# Patient Record
Sex: MALE | Race: OTHER | Employment: OTHER | URBAN - METROPOLITAN AREA
[De-identification: names, ages, dates, MRNs, and addresses within clinical notes are randomized per-mention and may not be internally consistent; named-entity substitution may affect disease eponyms.]

---

## 2017-03-27 ENCOUNTER — HOSPITAL ENCOUNTER (EMERGENCY)
Facility: HOSPITAL | Age: 67
Discharge: HOME | End: 2017-03-27
Attending: EMERGENCY MEDICINE
Payer: MEDICARE

## 2017-03-27 ENCOUNTER — APPOINTMENT (OUTPATIENT)
Dept: CT IMAGING | Facility: HOSPITAL | Age: 67
End: 2017-03-27
Attending: EMERGENCY MEDICINE
Payer: MEDICARE

## 2017-03-27 VITALS
TEMPERATURE: 98 F | WEIGHT: 180 LBS | BODY MASS INDEX: 27.28 KG/M2 | SYSTOLIC BLOOD PRESSURE: 139 MMHG | OXYGEN SATURATION: 96 % | RESPIRATION RATE: 16 BRPM | DIASTOLIC BLOOD PRESSURE: 95 MMHG | HEIGHT: 68 IN | HEART RATE: 73 BPM

## 2017-03-27 DIAGNOSIS — R42 VERTIGO: ICD-10-CM

## 2017-03-27 DIAGNOSIS — R51.9 NONINTRACTABLE EPISODIC HEADACHE, UNSPECIFIED HEADACHE TYPE: Primary | ICD-10-CM

## 2017-03-27 LAB
ALBUMIN SERPL BCP-MCNC: 4.6 G/DL (ref 3.5–4.8)
ALP SERPL-CCNC: 85 U/L (ref 32–100)
ALT SERPL-CCNC: 21 U/L (ref 17–63)
ANION GAP SERPL CALC-SCNC: 10 MMOL/L (ref 0–18)
AST SERPL-CCNC: 24 U/L (ref 15–41)
BACTERIA UR QL AUTO: NEGATIVE /HPF
BASOPHILS # BLD: 0.1 K/UL (ref 0–0.2)
BASOPHILS NFR BLD: 1 %
BILIRUB DIRECT SERPL-MCNC: 0.1 MG/DL (ref 0–0.2)
BILIRUB SERPL-MCNC: 0.8 MG/DL (ref 0.3–1.2)
BILIRUB UR QL: NEGATIVE
BUN SERPL-MCNC: 12 MG/DL (ref 8–20)
BUN/CREAT SERPL: 11.9 (ref 10–20)
CALCIUM SERPL-MCNC: 10.1 MG/DL (ref 8.5–10.5)
CHLORIDE SERPL-SCNC: 99 MMOL/L (ref 95–110)
CLARITY UR: CLEAR
CO2 SERPL-SCNC: 27 MMOL/L (ref 22–32)
COLOR UR: YELLOW
CREAT SERPL-MCNC: 1.01 MG/DL (ref 0.5–1.5)
EOSINOPHIL # BLD: 0.3 K/UL (ref 0–0.7)
EOSINOPHIL NFR BLD: 3 %
ERYTHROCYTE [DISTWIDTH] IN BLOOD BY AUTOMATED COUNT: 14.8 % (ref 11–15)
GLUCOSE SERPL-MCNC: 127 MG/DL (ref 70–99)
GLUCOSE UR-MCNC: NEGATIVE MG/DL
HCT VFR BLD AUTO: 46.5 % (ref 41–52)
HGB BLD-MCNC: 15.6 G/DL (ref 13.5–17.5)
HGB UR QL STRIP.AUTO: NEGATIVE
KETONES UR-MCNC: NEGATIVE MG/DL
LEUKOCYTE ESTERASE UR QL STRIP.AUTO: NEGATIVE
LIPASE SERPL-CCNC: 30 U/L (ref 22–51)
LYMPHOCYTES # BLD: 3.1 K/UL (ref 1–4)
LYMPHOCYTES NFR BLD: 27 %
MCH RBC QN AUTO: 30.4 PG (ref 27–32)
MCHC RBC AUTO-ENTMCNC: 33.5 G/DL (ref 32–37)
MCV RBC AUTO: 90.7 FL (ref 80–100)
MONOCYTES # BLD: 0.8 K/UL (ref 0–1)
MONOCYTES NFR BLD: 7 %
NEUTROPHILS # BLD AUTO: 7.3 K/UL (ref 1.8–7.7)
NEUTROPHILS NFR BLD: 64 %
NITRITE UR QL STRIP.AUTO: NEGATIVE
OSMOLALITY UR CALC.SUM OF ELEC: 283 MOSM/KG (ref 275–295)
PH UR: 7 [PH] (ref 5–8)
PLATELET # BLD AUTO: 241 K/UL (ref 140–400)
PMV BLD AUTO: 9.5 FL (ref 7.4–10.3)
POTASSIUM SERPL-SCNC: 4.4 MMOL/L (ref 3.3–5.1)
PROT SERPL-MCNC: 7.9 G/DL (ref 5.9–8.4)
PROT UR-MCNC: NEGATIVE MG/DL
RBC # BLD AUTO: 5.13 M/UL (ref 4.5–5.9)
RBC #/AREA URNS AUTO: 0 /HPF
SODIUM SERPL-SCNC: 136 MMOL/L (ref 136–144)
SP GR UR STRIP: 1.01 (ref 1–1.03)
UROBILINOGEN UR STRIP-ACNC: <2
VIT C UR-MCNC: 20 MG/DL
WBC # BLD AUTO: 11.5 K/UL (ref 4–11)
WBC #/AREA URNS AUTO: <1 /HPF

## 2017-03-27 PROCEDURE — 80048 BASIC METABOLIC PNL TOTAL CA: CPT

## 2017-03-27 PROCEDURE — 83690 ASSAY OF LIPASE: CPT

## 2017-03-27 PROCEDURE — 81003 URINALYSIS AUTO W/O SCOPE: CPT

## 2017-03-27 PROCEDURE — 85025 COMPLETE CBC W/AUTO DIFF WBC: CPT

## 2017-03-27 PROCEDURE — 99284 EMERGENCY DEPT VISIT MOD MDM: CPT

## 2017-03-27 PROCEDURE — 70450 CT HEAD/BRAIN W/O DYE: CPT

## 2017-03-27 PROCEDURE — 80076 HEPATIC FUNCTION PANEL: CPT

## 2017-03-27 PROCEDURE — 36415 COLL VENOUS BLD VENIPUNCTURE: CPT

## 2017-03-27 RX ORDER — ONDANSETRON 4 MG/1
4 TABLET, ORALLY DISINTEGRATING ORAL EVERY 4 HOURS PRN
Qty: 10 TABLET | Refills: 0 | Status: SHIPPED | OUTPATIENT
Start: 2017-03-27 | End: 2017-04-03

## 2017-03-27 RX ORDER — MECLIZINE HYDROCHLORIDE 25 MG/1
25 TABLET ORAL EVERY 6 HOURS PRN
Qty: 20 TABLET | Refills: 0 | Status: SHIPPED | OUTPATIENT
Start: 2017-03-27 | End: 2017-04-03

## 2017-03-28 NOTE — ED PROVIDER NOTES
Patient Seen in: [facility] AND [facility] Emergency Department    History   Patient presents with:  Abdomen/Flank Pain (GI/)    Stated Complaint: headache/nausea/vomiting    HPI    The patient is a 30-year-old male who presents with 9 days of intermittent hea 96%        Physical Exam   Constitutional: He is oriented to person, place, and time. He appears well-developed and well-nourished. HENT:   Head: Normocephalic and atraumatic.    Nose: Right sinus exhibits no maxillary sinus tenderness and no frontal sinu DIFFERENTIAL - Abnormal; Notable for the following:     WBC 11.5 (*)     All other components within normal limits   LIPASE - Normal   HEPATIC FUNCTION PANEL (7) - Normal   CBC WITH DIFFERENTIAL WITH PLATELET    Narrative:      The following orders were cre Tablet Dispersible  Take 1 tablet (4 mg total) by mouth every 4 (four) hours as needed for Nausea., Script printed, Disp-10 tablet, R-0

## 2022-03-07 ENCOUNTER — OFFICE VISIT (OUTPATIENT)
Dept: SURGERY | Facility: CLINIC | Age: 72
End: 2022-03-07
Payer: MEDICARE

## 2022-03-07 VITALS
DIASTOLIC BLOOD PRESSURE: 78 MMHG | HEIGHT: 68 IN | BODY MASS INDEX: 27.28 KG/M2 | HEART RATE: 55 BPM | WEIGHT: 180 LBS | SYSTOLIC BLOOD PRESSURE: 136 MMHG

## 2022-03-07 DIAGNOSIS — R97.20 ELEVATED PSA: Primary | ICD-10-CM

## 2022-03-07 PROCEDURE — 99204 OFFICE O/P NEW MOD 45 MIN: CPT | Performed by: UROLOGY

## 2022-04-12 ENCOUNTER — HOSPITAL ENCOUNTER (OUTPATIENT)
Dept: MRI IMAGING | Facility: HOSPITAL | Age: 72
Discharge: HOME | End: 2022-04-12
Attending: UROLOGY
Payer: MEDICARE

## 2022-04-12 DIAGNOSIS — R97.20 ELEVATED PSA: ICD-10-CM

## 2022-04-12 LAB — CREAT BLD-MCNC: 1 MG/DL

## 2022-04-12 PROCEDURE — 72197 MRI PELVIS W/O & W/DYE: CPT | Performed by: UROLOGY

## 2022-04-12 PROCEDURE — A9575 INJ GADOTERATE MEGLUMI 0.1ML: HCPCS | Performed by: UROLOGY

## 2022-04-12 PROCEDURE — 82565 ASSAY OF CREATININE: CPT

## 2022-04-18 ENCOUNTER — OFFICE VISIT (OUTPATIENT)
Dept: SURGERY | Facility: CLINIC | Age: 72
End: 2022-04-18
Payer: MEDICARE

## 2022-04-18 DIAGNOSIS — R97.20 ELEVATED PSA: Primary | ICD-10-CM

## 2022-04-18 PROCEDURE — 99213 OFFICE O/P EST LOW 20 MIN: CPT | Performed by: UROLOGY

## 2022-09-19 ENCOUNTER — OFFICE VISIT (OUTPATIENT)
Dept: SURGERY | Facility: CLINIC | Age: 72
End: 2022-09-19

## 2022-09-19 DIAGNOSIS — R97.20 ELEVATED PSA: Primary | ICD-10-CM

## 2022-09-19 RX ORDER — CEFDINIR 300 MG/1
300 CAPSULE ORAL EVERY 12 HOURS
Qty: 6 CAPSULE | Refills: 0 | Status: SHIPPED | OUTPATIENT
Start: 2022-09-19 | End: 2022-09-22

## 2022-09-19 RX ORDER — CIPROFLOXACIN 500 MG/1
500 TABLET, FILM COATED ORAL 2 TIMES DAILY
Qty: 6 TABLET | Refills: 0 | Status: SHIPPED | OUTPATIENT
Start: 2022-09-19

## 2022-09-23 ENCOUNTER — TELEPHONE (OUTPATIENT)
Dept: SURGERY | Facility: CLINIC | Age: 72
End: 2022-09-23

## 2022-09-23 NOTE — TELEPHONE ENCOUNTER
Called pt used , [place] ID# [id number] informed pt that we need to jose antonio his office prostate bx 10/21/22 to 10/28/22 due to change in Dr. Shayy Thayer schedule, pt is aware and agrees, informed pt to call the office if any questions for the prep, pt states he has all the instructions at home, pt aware to call sooner if he would like us to go over any prep with him sooner to the visti. Pt aware and thankful.

## 2022-10-28 RX ORDER — CEFDINIR 300 MG/1
300 CAPSULE ORAL EVERY 12 HOURS
Qty: 3 CAPSULE | Refills: 0 | Status: SHIPPED | OUTPATIENT
Start: 2022-10-28

## 2022-10-28 RX ORDER — CIPROFLOXACIN 500 MG/1
500 TABLET, FILM COATED ORAL 2 TIMES DAILY
Qty: 3 TABLET | Refills: 0 | Status: SHIPPED | OUTPATIENT
Start: 2022-10-28

## 2022-10-28 NOTE — TELEPHONE ENCOUNTER
Signed 3 tablet supply for cipro and cefdinir to pt pharmacy for pt to take in addition to the 3 tablets at home.

## 2022-10-31 ENCOUNTER — PROCEDURE (OUTPATIENT)
Dept: SURGERY | Facility: CLINIC | Age: 72
End: 2022-10-31
Payer: MEDICARE

## 2022-10-31 VITALS — DIASTOLIC BLOOD PRESSURE: 96 MMHG | HEART RATE: 64 BPM | SYSTOLIC BLOOD PRESSURE: 161 MMHG

## 2022-10-31 DIAGNOSIS — R97.20 ELEVATED PSA: Primary | ICD-10-CM

## 2022-10-31 PROCEDURE — 76942 ECHO GUIDE FOR BIOPSY: CPT | Performed by: UROLOGY

## 2022-10-31 PROCEDURE — 55700 BIOPSY OF PROSTATE,NEEDLE/PUNCH: CPT | Performed by: UROLOGY

## 2022-10-31 PROCEDURE — 99213 OFFICE O/P EST LOW 20 MIN: CPT | Performed by: UROLOGY

## 2022-11-04 ENCOUNTER — TELEPHONE (OUTPATIENT)
Dept: SURGERY | Facility: CLINIC | Age: 72
End: 2022-11-04

## 2022-11-07 ENCOUNTER — TELEPHONE (OUTPATIENT)
Dept: SURGERY | Facility: CLINIC | Age: 72
End: 2022-11-07

## 2022-11-07 NOTE — TELEPHONE ENCOUNTER
Pt had prostate biopsy with DEBBIE on 10/31/22 and pt states he has been having difficulty urinating since then. Pt states this morning he woke up and was straining a lot to urinate and was only able to urinate very small amount. Pt states he has been able to urinate more fluid volume as the day has gone by but is still having to push and strain. Denies visible blood in urine today, denies burning with urination, and denies pain, denies cloudiness in urine. Denies fevers or flu-like sx. Advised pt to come in tomorrow morning for bladder scan to check for urinary retention. Pt agreeable. Pt asking if he should be taking medication to help him urinate. Routed to [facility] due to [facility] [place] out of office;    Please advise, thank you.

## 2022-11-07 NOTE — TELEPHONE ENCOUNTER
Patients spouse states patient has biopsy on 10/31 and is having urinary problems. Patient is Danish speaking.   Please advise

## 2022-11-08 ENCOUNTER — NURSE ONLY (OUTPATIENT)
Dept: SURGERY | Facility: CLINIC | Age: 72
End: 2022-11-08
Payer: MEDICARE

## 2022-11-08 DIAGNOSIS — R30.0 DYSURIA: Primary | ICD-10-CM

## 2022-11-08 LAB
APPEARANCE: CLEAR
BILIRUB UR QL: NEGATIVE
BILIRUBIN: NEGATIVE
CLARITY UR: CLEAR
COLOR UR: YELLOW
GLUCOSE (URINE DIPSTICK): NEGATIVE MG/DL
GLUCOSE UR-MCNC: NEGATIVE MG/DL
KETONES (URINE DIPSTICK): NEGATIVE MG/DL
KETONES UR-MCNC: NEGATIVE MG/DL
MULTISTIX LOT#: ABNORMAL NUMERIC
NITRITE UR QL STRIP.AUTO: POSITIVE
NITRITE, URINE: POSITIVE
PH UR: 6 [PH] (ref 5–8)
PH, URINE: 6 (ref 4.5–8)
PROT UR-MCNC: NEGATIVE MG/DL
PROTEIN (URINE DIPSTICK): NEGATIVE MG/DL
SP GR UR STRIP: 1.01 (ref 1–1.03)
SPECIFIC GRAVITY: 1.01 (ref 1–1.03)
URINE-COLOR: YELLOW
UROBILINOGEN UR STRIP-ACNC: <2
UROBILINOGEN,SEMI-QN: 0.2 MG/DL (ref 0–1.9)
VIT C UR-MCNC: NEGATIVE MG/DL
WBC CLUMPS UR QL AUTO: PRESENT /HPF

## 2022-11-08 PROCEDURE — 81002 URINALYSIS NONAUTO W/O SCOPE: CPT | Performed by: NURSE PRACTITIONER

## 2022-11-08 PROCEDURE — 51798 US URINE CAPACITY MEASURE: CPT | Performed by: UROLOGY

## 2022-11-08 RX ORDER — SULFAMETHOXAZOLE AND TRIMETHOPRIM 800; 160 MG/1; MG/1
1 TABLET ORAL 2 TIMES DAILY
Qty: 20 TABLET | Refills: 0 | Status: SHIPPED | OUTPATIENT
Start: 2022-11-08 | End: 2022-11-10

## 2022-11-08 NOTE — PROGRESS NOTES
Pt here today for nurse visit due to dysuria since Thursday. Pt states he has been having to strain and push urine out with minimal output. This morning pt states urination has improved and is not having to strain to urinate. Pt was asked to urinate in office. UA manual performed and showed nitrates. Pt was bladder scanned and PVR 0 ml. Results given to [facility] who recommended UA and culture and ABX. Instructions given to pt and pt verbalized understanding. Pt also asking about biopsy results. Notified pt that [facility] will call them later today. Pt asked to call home phone number.

## 2022-11-17 ENCOUNTER — OFFICE VISIT (OUTPATIENT)
Dept: SURGERY | Facility: CLINIC | Age: 72
End: 2022-11-17
Payer: MEDICARE

## 2022-11-17 DIAGNOSIS — C61 PROSTATE CANCER (HCC): Primary | ICD-10-CM

## 2022-11-17 PROCEDURE — 99215 OFFICE O/P EST HI 40 MIN: CPT | Performed by: UROLOGY

## 2023-02-20 ENCOUNTER — OFFICE VISIT (OUTPATIENT)
Dept: SURGERY | Facility: CLINIC | Age: 73
End: 2023-02-20

## 2023-02-20 ENCOUNTER — LAB ENCOUNTER (OUTPATIENT)
Dept: LAB | Facility: HOSPITAL | Age: 73
End: 2023-02-20
Attending: UROLOGY
Payer: MEDICARE

## 2023-02-20 DIAGNOSIS — C61 PROSTATE CANCER (HCC): Primary | ICD-10-CM

## 2023-02-20 LAB — PSA SERPL-MCNC: 9.76 NG/ML (ref ?–4)

## 2023-02-20 PROCEDURE — 84153 ASSAY OF PSA TOTAL: CPT | Performed by: UROLOGY

## 2023-02-20 PROCEDURE — 36415 COLL VENOUS BLD VENIPUNCTURE: CPT | Performed by: UROLOGY

## 2023-02-20 PROCEDURE — 99213 OFFICE O/P EST LOW 20 MIN: CPT | Performed by: UROLOGY

## 2023-02-27 ENCOUNTER — TELEPHONE (OUTPATIENT)
Dept: SURGERY | Facility: CLINIC | Age: 73
End: 2023-02-27

## 2023-02-27 NOTE — TELEPHONE ENCOUNTER
- Call center transferred pt and wife Terese Toribio), returning call from this RN.    - Utilizing  Bland Lenz #[id number], I read K[facility] message to the pt and wife. Both acknowledged understanding of the information and next steps. - pt already has f/u appt on Monday, 5/22, so instructed pt to go to our lab towards the end of the week, the week prior to appt, in order to repeat the PSA. Both acknowledged understanding and agreed to plan.   - encounter complete

## 2023-02-27 NOTE — TELEPHONE ENCOUNTER
Utilizing  [place] DV#[id number], Candie on VM to call me back at  to discuss results and next steps.    ----- Message from Amy Bajwa MD sent at 2/27/2023  8:04 AM CST -----  Please contact this patient. Let him know that his PSA obtained after his last office visit has increased to 9.76. I would like the patient to see me in mid May with a repeat PSA 1 to 2 days prior to the visit. If the PSA continues to increase we may have to reconsider active surveillance as a method of managing his prostate cancer. I have placed an order for a repeat PSA to be done prior to that visit in mid May.

## 2023-05-17 ENCOUNTER — LAB ENCOUNTER (OUTPATIENT)
Dept: LAB | Facility: HOSPITAL | Age: 73
End: 2023-05-17
Attending: UROLOGY
Payer: MEDICARE

## 2023-05-17 LAB — PSA SERPL-MCNC: 9.56 NG/ML (ref ?–4)

## 2023-05-17 PROCEDURE — 36415 COLL VENOUS BLD VENIPUNCTURE: CPT | Performed by: UROLOGY

## 2023-05-17 PROCEDURE — 84153 ASSAY OF PSA TOTAL: CPT | Performed by: UROLOGY

## 2023-05-22 ENCOUNTER — OFFICE VISIT (OUTPATIENT)
Dept: SURGERY | Facility: CLINIC | Age: 73
End: 2023-05-22

## 2023-05-22 DIAGNOSIS — C61 PROSTATE CANCER (HCC): Primary | ICD-10-CM

## 2023-05-22 PROCEDURE — 99214 OFFICE O/P EST MOD 30 MIN: CPT | Performed by: UROLOGY

## 2023-05-23 ENCOUNTER — TELEPHONE (OUTPATIENT)
Dept: HEMATOLOGY/ONCOLOGY | Facility: HOSPITAL | Age: 73
End: 2023-05-23

## 2023-05-23 NOTE — TELEPHONE ENCOUNTER
Please call Dominick Monahan to schedule a consult with Dr Bobby Bryant for the patient. Referred by Dr Kamini Denson. DX-Prostate cancer.

## 2023-06-07 ENCOUNTER — OFFICE VISIT (OUTPATIENT)
Dept: RADIATION ONCOLOGY | Facility: HOSPITAL | Age: 73
End: 2023-06-07
Attending: RADIOLOGY
Payer: MEDICARE

## 2023-06-07 VITALS
DIASTOLIC BLOOD PRESSURE: 73 MMHG | SYSTOLIC BLOOD PRESSURE: 155 MMHG | WEIGHT: 178.38 LBS | OXYGEN SATURATION: 100 % | RESPIRATION RATE: 18 BRPM | HEART RATE: 58 BPM | TEMPERATURE: 97 F | BODY MASS INDEX: 27 KG/M2

## 2023-06-07 DIAGNOSIS — C61 PROSTATE CANCER (HCC): Primary | ICD-10-CM

## 2023-06-07 PROCEDURE — 99212 OFFICE O/P EST SF 10 MIN: CPT

## 2023-06-07 NOTE — CONSULTS
[facility]    PATIENT'S NAME: Rob Blakely   RADIATION ONCOLOGIST: Gareth Abdalla. Travis Orantes MD   PATIENT ACCOUNT #: [de-identified] LOCATION: [facility] RECORD #: [id number] YOB: 1950   CONSULTATION DATE: 06/07/2023       RADIATION ONCOLOGY CONSULTATION    REFERRING PHYSICIAN:  Yasmany Martini MD    DIAGNOSIS:  Adenocarcinoma of the prostate, PSA 9.76, grade group 2, favorable intermediate risk. HISTORY OF PRESENT ILLNESS:  The patient is a 59-year-old male diagnosed with prostate cancer back in November 2022. He had a PSA which had been rising but no suspicious findings on physical exam.  The PSA had been 4.43 in January 2022 but jumped up to 7.85 by July of that year. It continued to rise and went as high as 9.76. Imaging of the prostate included an MRI back on April 12, 2022, which showed a posteromedial left paramedian area of amorphous hypoattenuation with associated diffusion restriction. It was deemed PI-RADS category 3 with no evidence of any lymphadenopathy or osseous disease. Eventually, the patient did have a biopsy on 11/01/2022. This demonstrated adenocarcinoma of the prostate of grade group 2 in 1 of 3 cores from the right apex. All other prostatic cores were negative. Even in the involved core, only 10% of submitted tissue had disease and there was no perineural invasion. However, the patient did have a difficult time after the biopsy and this was complicated by an E. coli infection with prostatitis and a UTI. This was quite problematic for him and difficult for the infection to resolve. After discussing his potential treatment options, the patient initially opted for active surveillance, but upon realizing that this would require repeat biopsies in addition to blood draws, the patient wished to reconsider his decision regarding treatment.   He was uninterested in surgery and was, therefore, referred to Radiation Oncology for discussion regarding radiation treatment. The patient otherwise is feeling well and denies any particular issues or complaints. He has an AUA score today of 3/35 and an ED score of 9/25. He denies any fevers, chills, weight loss, changes in appetite, bony or joint pain, or other constitutional symptoms. PAST MEDICAL HISTORY:  The patient has a past history of hyperlipidemia, arthritis, hypertension, as well as diabetes, and prostate cancer as per HPI. PAST SURGICAL HISTORY:  None. MEDICATIONS:  Atorvastatin, enalapril, ibuprofen, meclizine, metformin, and pioglitazone. ALLERGIES:  No known drug allergies. FAMILY HISTORY:  Negative for malignancy. SOCIAL HISTORY:  The patient is  and seen in consultation with his wife. He is retired but does do some part-time work. He denies any smoking or alcohol history and has no transportation-related difficulties. REVIEW OF SYSTEMS:  A 14-point review of systems is performed. Pertinent positives and negatives are as per HPI. PHYSICAL EXAMINATION:    GENERAL:  A 70-year-old male who is pleasant, cooperative, and alert, awake, and oriented x3. He is in no acute distress. He has an ECOG performance score of 1 and a current pain score of 0. VITAL SIGNS:  Blood pressure 155/73, pulse of 58, respiratory rate of 18, and temperature 97.0. His weight is 178 pounds. HEENT:  Pupils are equal, round, react to light with accommodation and the extraocular movements are intact. The oral cavity is without ulceration or lesions. NECK:  Supple with no lymphadenopathy. LUNGS:  Clear to auscultation bilaterally. HEART:  Regular rate and rhythm. Normal S1, S2, and no audible murmurs. LYMPHATICS:  There is no supraclavicular, axillary, inguinal lymphadenopathy. ABDOMEN:  Soft, nontender, and nondistended with normoactive bowel sounds and no hepatosplenomegaly. EXTREMITIES:  Extremities without clubbing, cyanosis, edema.   NEUROLOGIC:  Cranial nerves II-XII are grossly intact. There are no focal deficits. IMPRESSION:  This is a 70-year-old male with a diagnosis of a favorable intermediate-risk prostate cancer. He has a PSA which has reached as high as 9.76 and grade group 2 disease. He only has a single core of involvement with a low percentage of cancer within the specimen and no perineural invasion. A MRI back in April 2022 showed no evidence of any regional or distant disease. The patient is slated for repeat MRI in the next week or so. He is uninterested in surgery as definitive therapy. RECOMMENDATIONS:  I do believe the patient is a good candidate for definitive treatment with radiation. Certainly, active surveillance is a reasonable option. As part of this treatment, however, the patient does require routine blood draws and repeat biopsies at defined intervals. The patient is uninterested in having additional biopsies based upon his prior infection and would, therefore, prefer to move forward with definitive therapy. He is uninterested in surgery and presents to discuss radiation. To that extent, I recommend 7000 cGy in 250 cGy fractions. I would treat the prostate alone with no effort to duglas after draining lymphatics, as the risk appears to be quite low. Of course, if the upcoming MRI shows something different, I would alter this plan. Otherwise, I would utilize both intensity-modulated radiotherapy as well as image guidance to get the most accurate and precise treatment possible while trying to spare morbidity to the bowel and rectum and other surrounding tissues. With this treatment, I am quite optimistic that we will have a good outcome and the patient's overall likelihood of cure is high. I do not recommend utilization of any androgen deprivation, as this would not be required given favorable intermediate-risk disease. I then had a long talk with the patient and his wife regarding the potential side effects of therapy.   I told him that he should tolerate treatment well, but there will be a number of issues. This specifically includes urinary and bowel problems, and they will worsen during the course of therapy. Namely, he will experience urinary frequency, urgency, incomplete emptying, nocturia, and hesitancy. He also will have some loose stools or diarrhea. These side effects will all progress during therapy but should resolve fairly quickly after radiation is complete. He will have fatigue as well, and this will follow a similar pattern. After treatment is over, I would expect his side effects to resolve within a few weeks. Long-term or permanent side effects including radiation cystitis or radiation proctitis are theoretically possible though fairly unlikely given modern radiotherapeutic techniques and image-guided treatment. Still, these possibilities do exist, and the patient was made aware. Additionally, I would expect him to retain normal sexual functioning, but it is possible that he may have decreased capabilities and even impotence as a result of therapy. Following this long and thorough discussion of all the risks and benefits of treatment, the patient and his wife indicate that they understand all these risks and do wish to proceed with treatment as I previously dictated. We, therefore, will schedule the patient for simulation soon. He will also be getting his MRI which I will fuse to his treatment planning CT for accurate targeting. We then will proceed with treatment as previously dictated. Thank you very much for allowing me the opportunity to participate in the care of this patient. If there should be any questions regarding the radiotherapy, please feel free to contact me at any time. Dictated By Jesi Richards MD  d: 06/07/2023 10:46:23  t: 06/07/2023 11:02:57  [facility] [id number]  [facility]/    cc: MD Teri Pollack.  Cassius Walter MD

## 2023-06-07 NOTE — PATIENT INSTRUCTIONS
We will call you to schedule ct simulation for planning of radiation. For any questions in regard to radiation call us at [phone].

## 2023-06-30 ENCOUNTER — APPOINTMENT (OUTPATIENT)
Dept: RADIATION ONCOLOGY | Facility: HOSPITAL | Age: 73
End: 2023-06-30
Attending: RADIOLOGY
Payer: MEDICARE

## 2023-06-30 ENCOUNTER — HOSPITAL ENCOUNTER (OUTPATIENT)
Dept: MRI IMAGING | Facility: HOSPITAL | Age: 73
Discharge: HOME | End: 2023-06-30
Attending: UROLOGY
Payer: MEDICARE

## 2023-06-30 DIAGNOSIS — C61 PROSTATE CANCER (HCC): ICD-10-CM

## 2023-06-30 PROCEDURE — A9575 INJ GADOTERATE MEGLUMI 0.1ML: HCPCS | Performed by: UROLOGY

## 2023-06-30 PROCEDURE — 72197 MRI PELVIS W/O & W/DYE: CPT | Performed by: UROLOGY

## 2023-06-30 RX ORDER — GADOTERATE MEGLUMINE 376.9 MG/ML
20 INJECTION INTRAVENOUS
Status: COMPLETED | OUTPATIENT
Start: 2023-06-30 | End: 2023-06-30

## 2023-06-30 RX ADMIN — GADOTERATE MEGLUMINE 16 ML: 376.9 INJECTION INTRAVENOUS at 09:04:00

## 2023-07-01 ENCOUNTER — APPOINTMENT (OUTPATIENT)
Dept: RADIATION ONCOLOGY | Facility: HOSPITAL | Age: 73
End: 2023-07-01
Attending: RADIOLOGY
Payer: MEDICARE

## 2023-07-01 ENCOUNTER — APPOINTMENT (OUTPATIENT)
Dept: RADIATION ONCOLOGY | Facility: HOSPITAL | Age: 73
End: 2023-07-01
Attending: RADIOLOGY

## 2023-07-01 ENCOUNTER — NURSE ONLY (OUTPATIENT)
Dept: RADIATION ONCOLOGY | Facility: HOSPITAL | Age: 73
End: 2023-07-01
Attending: RADIOLOGY

## 2023-07-05 PROCEDURE — 77399 UNLISTED PX MED RADJ PHYSICS: CPT | Performed by: RADIOLOGY

## 2023-07-10 ENCOUNTER — TELEPHONE (OUTPATIENT)
Dept: SURGERY | Facility: CLINIC | Age: 73
End: 2023-07-10

## 2023-07-10 PROCEDURE — 77301 RADIOTHERAPY DOSE PLAN IMRT: CPT | Performed by: RADIOLOGY

## 2023-07-10 PROCEDURE — 77338 DESIGN MLC DEVICE FOR IMRT: CPT | Performed by: RADIOLOGY

## 2023-07-10 PROCEDURE — 77300 RADIATION THERAPY DOSE PLAN: CPT | Performed by: RADIOLOGY

## 2023-07-10 NOTE — TELEPHONE ENCOUNTER
----- Message from Silva Steven MD sent at 7/10/2023  7:43 AM CDT -----  Urology staff,  Please contact this patient. Let him know that I reviewed his recent prostate MRI. It shows slight progression compared to his previous MRI in terms of signal abnormalities that indicate the likely presence of clinically significant disease. Of note, the location of the disease on MRI is different from the location where cancer was detected on his biopsy. I would encourage him to proceed with radiation treatments as discussed during his recent visit with Dr. Jeremías Kaur.

## 2023-07-10 NOTE — TELEPHONE ENCOUNTER
I called pt with the assistance of Indian interpretor Leatha Carrasco # [id number] and Informed him of [facility]'s results msg as stated below and  pt verbalized understanding and compliance.

## 2023-07-17 ENCOUNTER — APPOINTMENT (OUTPATIENT)
Dept: RADIATION ONCOLOGY | Facility: HOSPITAL | Age: 73
End: 2023-07-17
Attending: RADIOLOGY
Payer: MEDICARE

## 2023-07-17 PROCEDURE — 77385 HC IMRT SIMPLE: CPT | Performed by: RADIOLOGY

## 2023-07-18 ENCOUNTER — OFFICE VISIT (OUTPATIENT)
Dept: RADIATION ONCOLOGY | Facility: HOSPITAL | Age: 73
End: 2023-07-18
Attending: RADIOLOGY
Payer: MEDICARE

## 2023-07-18 VITALS
HEART RATE: 67 BPM | OXYGEN SATURATION: 100 % | SYSTOLIC BLOOD PRESSURE: 152 MMHG | TEMPERATURE: 97 F | BODY MASS INDEX: 26 KG/M2 | DIASTOLIC BLOOD PRESSURE: 78 MMHG | WEIGHT: 174 LBS | RESPIRATION RATE: 18 BRPM

## 2023-07-18 DIAGNOSIS — C61 PROSTATE CANCER (HCC): Primary | ICD-10-CM

## 2023-07-18 PROCEDURE — 77385 HC IMRT SIMPLE: CPT | Performed by: RADIOLOGY

## 2023-07-19 PROCEDURE — 77385 HC IMRT SIMPLE: CPT | Performed by: RADIOLOGY

## 2023-07-20 PROCEDURE — 77385 HC IMRT SIMPLE: CPT | Performed by: RADIOLOGY

## 2023-07-21 PROCEDURE — 77385 HC IMRT SIMPLE: CPT | Performed by: RADIOLOGY

## 2023-07-21 PROCEDURE — 77336 RADIATION PHYSICS CONSULT: CPT | Performed by: RADIOLOGY

## 2023-07-24 PROCEDURE — 77385 HC IMRT SIMPLE: CPT | Performed by: RADIOLOGY

## 2023-07-25 ENCOUNTER — OFFICE VISIT (OUTPATIENT)
Dept: RADIATION ONCOLOGY | Facility: HOSPITAL | Age: 73
End: 2023-07-25
Attending: RADIOLOGY
Payer: MEDICARE

## 2023-07-25 VITALS
SYSTOLIC BLOOD PRESSURE: 150 MMHG | RESPIRATION RATE: 18 BRPM | DIASTOLIC BLOOD PRESSURE: 75 MMHG | BODY MASS INDEX: 27 KG/M2 | HEART RATE: 60 BPM | TEMPERATURE: 97 F | WEIGHT: 179 LBS | OXYGEN SATURATION: 100 %

## 2023-07-25 DIAGNOSIS — C61 PROSTATE CANCER (HCC): Primary | ICD-10-CM

## 2023-07-25 PROCEDURE — 77385 HC IMRT SIMPLE: CPT | Performed by: RADIOLOGY

## 2023-07-26 PROCEDURE — 77385 HC IMRT SIMPLE: CPT | Performed by: RADIOLOGY

## 2023-07-27 PROCEDURE — 77385 HC IMRT SIMPLE: CPT | Performed by: RADIOLOGY

## 2023-07-28 PROCEDURE — 77385 HC IMRT SIMPLE: CPT | Performed by: RADIOLOGY

## 2023-07-28 PROCEDURE — 77336 RADIATION PHYSICS CONSULT: CPT | Performed by: RADIOLOGY

## 2023-07-31 PROCEDURE — 77385 HC IMRT SIMPLE: CPT | Performed by: RADIOLOGY

## 2023-08-01 ENCOUNTER — APPOINTMENT (OUTPATIENT)
Dept: RADIATION ONCOLOGY | Facility: HOSPITAL | Age: 73
End: 2023-08-01
Attending: RADIOLOGY
Payer: MEDICARE

## 2023-08-01 ENCOUNTER — HOSPITAL ENCOUNTER (OUTPATIENT)
Dept: RADIATION ONCOLOGY | Facility: HOSPITAL | Age: 73
Discharge: HOME | End: 2023-08-01
Attending: RADIOLOGY
Payer: MEDICARE

## 2023-08-01 ENCOUNTER — TELEPHONE (OUTPATIENT)
Dept: RADIATION ONCOLOGY | Facility: HOSPITAL | Age: 73
End: 2023-08-01

## 2023-08-01 VITALS
BODY MASS INDEX: 27 KG/M2 | HEART RATE: 70 BPM | TEMPERATURE: 97 F | RESPIRATION RATE: 18 BRPM | OXYGEN SATURATION: 98 % | WEIGHT: 177 LBS | SYSTOLIC BLOOD PRESSURE: 145 MMHG | DIASTOLIC BLOOD PRESSURE: 82 MMHG

## 2023-08-01 DIAGNOSIS — C61 PROSTATE CANCER (HCC): Primary | ICD-10-CM

## 2023-08-01 DIAGNOSIS — C61 PROSTATE CANCER (HCC): ICD-10-CM

## 2023-08-01 PROCEDURE — 77385 HC IMRT SIMPLE: CPT | Performed by: RADIOLOGY

## 2023-08-01 RX ORDER — TAMSULOSIN HYDROCHLORIDE 0.4 MG/1
0.4 CAPSULE ORAL DAILY
Qty: 30 CAPSULE | Refills: 1 | Status: SHIPPED | OUTPATIENT
Start: 2023-08-01 | End: 2023-08-01

## 2023-08-01 RX ORDER — TAMSULOSIN HYDROCHLORIDE 0.4 MG/1
0.4 CAPSULE ORAL DAILY
Qty: 30 CAPSULE | Refills: 5 | Status: SHIPPED | OUTPATIENT
Start: 2023-08-01 | End: 2024-01-28

## 2023-08-01 NOTE — TELEPHONE ENCOUNTER
[facility] 90 Day Prescription Request: tamsulosin 0.4 MG Oral Cap Take 1 capsule (0.4 mg total) by mouth daily. , Normal, Disp-30 capsule, [facility] [address] [facility], [phone], [phone] Thanks Marv villagomez

## 2023-08-02 PROCEDURE — 77385 HC IMRT SIMPLE: CPT | Performed by: RADIOLOGY

## 2023-08-03 ENCOUNTER — TELEPHONE (OUTPATIENT)
Dept: RADIATION ONCOLOGY | Facility: HOSPITAL | Age: 73
End: 2023-08-03

## 2023-08-08 ENCOUNTER — OFFICE VISIT (OUTPATIENT)
Dept: RADIATION ONCOLOGY | Facility: HOSPITAL | Age: 73
End: 2023-08-08
Attending: RADIOLOGY
Payer: MEDICARE

## 2023-08-08 VITALS
DIASTOLIC BLOOD PRESSURE: 78 MMHG | BODY MASS INDEX: 27 KG/M2 | HEART RATE: 67 BPM | RESPIRATION RATE: 18 BRPM | OXYGEN SATURATION: 100 % | WEIGHT: 179.63 LBS | TEMPERATURE: 97 F | SYSTOLIC BLOOD PRESSURE: 150 MMHG

## 2023-08-08 DIAGNOSIS — C61 PROSTATE CANCER (HCC): Primary | ICD-10-CM

## 2023-08-10 ENCOUNTER — DIETICIAN VISIT (OUTPATIENT)
Dept: NUTRITION | Facility: HOSPITAL | Age: 73
End: 2023-08-10

## 2023-08-10 VITALS — BODY MASS INDEX: 27 KG/M2 | HEIGHT: 68 IN

## 2023-08-15 ENCOUNTER — OFFICE VISIT (OUTPATIENT)
Dept: RADIATION ONCOLOGY | Facility: HOSPITAL | Age: 73
End: 2023-08-15
Attending: RADIOLOGY
Payer: MEDICARE

## 2023-08-15 VITALS
TEMPERATURE: 97 F | OXYGEN SATURATION: 100 % | DIASTOLIC BLOOD PRESSURE: 71 MMHG | SYSTOLIC BLOOD PRESSURE: 140 MMHG | WEIGHT: 181 LBS | HEART RATE: 72 BPM | RESPIRATION RATE: 18 BRPM | BODY MASS INDEX: 28 KG/M2

## 2023-08-15 DIAGNOSIS — C61 PROSTATE CANCER (HCC): Primary | ICD-10-CM

## 2023-08-22 ENCOUNTER — OFFICE VISIT (OUTPATIENT)
Dept: RADIATION ONCOLOGY | Facility: HOSPITAL | Age: 73
End: 2023-08-22
Attending: RADIOLOGY
Payer: MEDICARE

## 2023-08-22 VITALS
HEART RATE: 72 BPM | TEMPERATURE: 98 F | RESPIRATION RATE: 18 BRPM | DIASTOLIC BLOOD PRESSURE: 63 MMHG | WEIGHT: 180.38 LBS | OXYGEN SATURATION: 98 % | SYSTOLIC BLOOD PRESSURE: 118 MMHG | BODY MASS INDEX: 27 KG/M2

## 2023-08-22 DIAGNOSIS — C61 PROSTATE CANCER (HCC): Primary | ICD-10-CM

## 2023-08-22 NOTE — PATIENT INSTRUCTIONS
Follow up with Dr. Michele Yarbrough in 3 months. Yoandy Mendoza will call you to schedule your follow up appointment. Please call [phone] with any radiation questions. 1 week prior to PSA blood test, please refrain from bike riding, sexual activity and no prostate exams. Schedule your PSA prior to your follow up. Call [phone] to schedule.

## 2023-09-28 ENCOUNTER — TELEPHONE (OUTPATIENT)
Dept: RADIATION ONCOLOGY | Facility: HOSPITAL | Age: 73
End: 2023-09-28

## 2023-11-15 ENCOUNTER — LAB ENCOUNTER (OUTPATIENT)
Dept: LAB | Facility: HOSPITAL | Age: 73
End: 2023-11-15
Attending: RADIOLOGY
Payer: MEDICARE

## 2023-11-15 DIAGNOSIS — C61 PROSTATE CANCER (HCC): ICD-10-CM

## 2023-11-15 LAB
PSA SERPL-MCNC: 2.45 NG/ML (ref ?–4)
PSA SERPL-MCNC: 3.42 NG/ML (ref ?–4)

## 2023-11-15 PROCEDURE — 84153 ASSAY OF PSA TOTAL: CPT

## 2023-11-15 PROCEDURE — 36415 COLL VENOUS BLD VENIPUNCTURE: CPT

## 2023-11-17 ENCOUNTER — OFFICE VISIT (OUTPATIENT)
Dept: RADIATION ONCOLOGY | Facility: HOSPITAL | Age: 73
End: 2023-11-17
Attending: RADIOLOGY
Payer: MEDICARE

## 2023-11-17 VITALS
WEIGHT: 177.81 LBS | HEART RATE: 57 BPM | DIASTOLIC BLOOD PRESSURE: 95 MMHG | BODY MASS INDEX: 27 KG/M2 | TEMPERATURE: 97 F | OXYGEN SATURATION: 98 % | RESPIRATION RATE: 18 BRPM | SYSTOLIC BLOOD PRESSURE: 158 MMHG

## 2023-11-17 DIAGNOSIS — C61 PROSTATE CANCER (HCC): Primary | ICD-10-CM

## 2023-11-17 PROCEDURE — 99211 OFF/OP EST MAY X REQ PHY/QHP: CPT

## 2023-11-17 NOTE — PATIENT INSTRUCTIONS
Follow up with Dr. Darrold Brittle in 6 months. Maggi Khan will call you to schedule your follow up appointment. Please call [phone] with any radiation questions. 1 week prior to PSA blood test, please refrain from bike riding, sexual activity and no prostate exams. Schedule your PSA prior to your follow up. Call [phone] to schedule.

## 2023-11-18 NOTE — PROGRESS NOTES
[facility]    PATIENT'S NAME: Romayne Knapp   RADIATION ONCOLOGIST: Patrick Sandoval. Iraida Ch MD   PATIENT ACCOUNT #: [de-identified] LOCATION: [facility] RECORD #: [id number] YOB: 1950   FOLLOW-UP DATE: 11/17/2023       RADIATION ONCOLOGY FOLLOW-UP NOTE    REFERRING PHYSICIAN:  Timmy Martínez MD     DIAGNOSIS:  Adenocarcinoma of the prostate, PSA 9.76, grade group 2, favorable intermediate risk. REGION TREATED:  Prostate, 7000 cGy, completed 08/24/2023. INTERVAL SINCE COMPLETION OF RADIATION THERAPY:  3 months. PATIENT STATUS:  Clinically and biochemically LARRY. HISTORY:  The patient is a 66-year-old male who presents today for a routine followup visit. He has a history of a favorable intermediate-risk prostate cancer with a PSA up to 9.76. A biopsy had showed grade group 2 disease in 1 of 3 cores from the right apex, and all other cores were negative. Even within the involved core, only 10% of the submitted tissue had disease with no perineural invasion. He did have a difficult time after the biopsy, as it was complicated by an E coli infection with prostatitis and a UTI. Eventually this resolved. He had originally wanted to opt for active surveillance, but when he heard that additional biopsies would be required, he wished to proceed with treatment. I then treated the prostate to 7000 cGy in 28 fractions, completing on 08/24/2023. The patient presents today for his first followup visit since completing his radiation. Overall, he is doing extremely well. He denies any particular issues or complaints and feels that he has made a full recovery from the radiation. He has no bowel-related problems, and his appetite is normal.  He has an AUA score today of 5/35. This is very slightly increased from his pretreatment level of 3/35. He continues to have a low ED score. He denies other issues or complaints. His most recent PSA was on 11/15/2023.   This is read as a PSA of 3.42 and a total PSA of 2.45. They are running 2 separate assays, and there is some confusion as to which to use. PHYSICAL EXAMINATION:    VITAL SIGNS:  On exam today, the patient has a blood pressure 158/95, pulse of 57, respiratory rate of 18, and a temperature of 97.3. His pain score of 0 and a weight of 177 pounds. NECK:  Supple with no lymphadenopathy. LUNGS:  Clear to auscultation bilaterally. HEART:  Regular rate and rhythm, with normal S1 and S2, and no audible murmurs. LYMPHATICS:  There is no supraclavicular, axillary, or inguinal lymphadenopathy. ABDOMEN:  Benign. IMPRESSION AND RECOMMENDATIONS:  Overall, the patient is doing very well at this time. He has recovered nicely from any acute side effects of radiotherapy. He has had a good biochemical response. There is some discrepancy within the 2 assays being utilized, and I am uncertain as to which is more appropriate in his case. Nevertheless, it does not make much difference as they both show a significant decrease from his pretreatment level, and I would expect a number to continue to drop over the next 6 to 9 months. I, therefore, would like to see the patient again for followup in 6 months. I will repeat a PSA prior to that time. I told him that if he should have any problems or questions prior to that time, he should feel free to contact my office, and I would be happy to see him sooner. Thank you very much for allowing me the opportunity to participate in the care of this patient. If there should be any questions regarding the radiotherapy, please feel free to contact me at any time. Dictated By Tierra Yarbrough MD  d: 11/17/2023 09:46:57  t: 11/17/2023 21:21:05  Job [id number]  [facility]/    cc: MD Jinny Benavides.  Fransisco Baeza MD

## 2024-03-11 PROBLEM — E11.9 TYPE 2 DIABETES MELLITUS WITHOUT COMPLICATION, WITHOUT LONG-TERM CURRENT USE OF INSULIN (HCC): Status: ACTIVE | Noted: 2024-03-11

## 2024-03-11 PROBLEM — M06.9 RHEUMATOID ARTHRITIS INVOLVING MULTIPLE SITES, UNSPECIFIED WHETHER RHEUMATOID FACTOR PRESENT (HCC): Status: ACTIVE | Noted: 2024-03-11

## 2024-05-14 ENCOUNTER — LAB ENCOUNTER (OUTPATIENT)
Dept: LAB | Facility: HOSPITAL | Age: 74
End: 2024-05-14
Attending: RADIOLOGY

## 2024-05-14 DIAGNOSIS — C61 PROSTATE CANCER (HCC): ICD-10-CM

## 2024-05-14 LAB — PSA SERPL-MCNC: 1.21 NG/ML (ref ?–4)

## 2024-05-14 PROCEDURE — 36415 COLL VENOUS BLD VENIPUNCTURE: CPT

## 2024-05-14 PROCEDURE — 84153 ASSAY OF PSA TOTAL: CPT

## 2024-05-16 NOTE — PROGRESS NOTES
Nursing Follow-Up Note    Patient: Ga Pop  YOB: 1950  Age: 73 year old  Radiation Oncologist: Dr. Hosea Murillo  Referring Physician: No ref. provider found  Chief Complaint:   Chief Complaint   Patient presents with    Follow - Up     PROSTATE CA     Date: 5/16/2024    Toxicities: N/A    Vital Signs: BP (!) 144/92 (BP Location: Right arm, Patient Position: Sitting, Cuff Size: large)   Pulse 68   Temp 97.9 °F (36.6 °C) (Oral)   Resp 20   Wt 79.7 kg (175 lb 12.8 oz)   SpO2 99%   BMI 26.73 kg/m² ,   Wt Readings from Last 6 Encounters:   05/17/24 79.7 kg (175 lb 12.8 oz)   03/25/24 82.6 kg (182 lb)   03/11/24 82.6 kg (182 lb)   11/17/23 80.6 kg (177 lb 12.8 oz)   08/22/23 81.8 kg (180 lb 6.4 oz)   08/21/23 81.2 kg (179 lb)       Allergies:  No Known Allergies    Nursing Note: Pt completed prostate RT on 8/24/23. PSA on 5/14/23 = 1.21. Pt feeling well. Denies dysuria, hematuria, incontinence. Stream steady, nocturia x 2. BM regular, no proctitis. Appetite good.

## 2024-05-17 ENCOUNTER — OFFICE VISIT (OUTPATIENT)
Dept: RADIATION ONCOLOGY | Facility: HOSPITAL | Age: 74
End: 2024-05-17
Attending: RADIOLOGY

## 2024-05-17 VITALS
OXYGEN SATURATION: 99 % | RESPIRATION RATE: 20 BRPM | WEIGHT: 175.81 LBS | DIASTOLIC BLOOD PRESSURE: 92 MMHG | HEART RATE: 68 BPM | SYSTOLIC BLOOD PRESSURE: 144 MMHG | BODY MASS INDEX: 27 KG/M2 | TEMPERATURE: 98 F

## 2024-05-17 DIAGNOSIS — C61 PROSTATE CANCER (HCC): Primary | ICD-10-CM

## 2024-05-17 PROCEDURE — 99211 OFF/OP EST MAY X REQ PHY/QHP: CPT

## 2024-05-17 NOTE — PATIENT INSTRUCTIONS
- WE WILL CALL TO SCHEDULE YOUR FOLLOW-UP APPOINTMENT WITH DR. RADHA RAMOS IN 6 MONTHS AFTER YOUR NEXT PSA.        - CALL [phone] IF YOU HAVE ANY QUESTIONS/CONCERNS REGARDING RADIATION THERAPY

## 2024-05-18 NOTE — PROGRESS NOTES
[facility]    PATIENT'S NAME: LORIN GUERRA   RADIATION ONCOLOGIST: Hosea Murillo MD   PATIENT ACCOUNT #: [id number] LOCATION: [facility]   MEDICAL RECORD #: [id number] YOB: 1950   FOLLOW-UP DATE: 05/17/2024       RADIATION ONCOLOGY FOLLOW-UP NOTE    REFERRING PHYSICIAN:  Nahomy Hirsch MD.    DIAGNOSIS:  Adenocarcinoma of the prostate, PSA 9.76, grade group 2, favorable intermediate risk.    REGION TREATED:  Prostate, 7000 cGy, completed 08/24/2023.    INTERVAL SINCE COMPLETION OF RADIATION THERAPY:  Nine months.    PATIENT STATUS:  Clinically and biochemically LARRY.      HISTORY:  The patient is a 73-year-old male who presents today for a routine followup visit.  He has a history of a favorable intermediate risk prostate cancer with a PSA up to 9.76.  A biopsy showed grade group 2 disease in 1 of 3 cores from the right apex, and all other cores were negative.  Even within the involved core, only 10% of the submitted tissue had disease with no perineural invasion.  He had a difficult time after the biopsy as it was complicated by an E coli infection with prostatitis and the UTI.  After this resolved, the patient ultimately was referred to Radiation Oncology, and we decided to proceed with treatment to 7000 cGy in 28 fractions.  His treatments completed on 08/24/2023, and he was treated without ADT.    On his visit today, the patient continues to do very well.  He denies any particular issues or complaints at this time.  He reports good appetite with no fatigue.  He has an AUA score today of 10/35.  This is slightly up from his level from his most recent visit, but on further review, the patient states that his urinary functioning is essentially unchanged.  He has an ED score of 5/25.  The remainder of his medical health is stable.     His most recent PSA was on 05/14/2024 and is noted to be 1.21.  This is down from 2.45 in his visit in November and continues on a downward  trend.    PHYSICAL EXAMINATION:    VITAL SIGNS:  On exam today, the patient is noted to have a blood pressure of 144/92, pulse of 68, respiratory rate of 20, and temperature of 97.9 degrees.  He has a pain score of 0.  NECK:  Supple with no lymphadenopathy.  LUNGS:  Clear to auscultation bilaterally.  HEART:  Regular rate and rhythm.  Normal S1, S2.  No audible murmurs.    LYMPHATICS:  There is no supraclavicular, axillary, inguinal lymphadenopathy.  ABDOMEN:  Benign.    IMPRESSION AND RECOMMENDATIONS:  Overall, the patient is doing very well at this time.  He is clinically and biochemically free of disease.  His PSA does continue to drop and will probably continue to do so over the next 3 to 6 months.  Otherwise, he has recovered nicely from the acute side effects of radiation and has no meaningful side effects or problems at this point.    I would like to see him again for followup in 6 months and would repeat a PSA prior to that visit.  I did tell him that if he should have any problems or questions prior to that time, he should feel free to contact my office, I would be happy to see him sooner.    Thank you very much for allowing me the opportunity to participate in the care of this patient.  If there should be any questions regarding the radiotherapy, please feel free to contact me at any time.    Dictated By Hosea Murillo MD  d: 05/17/2024 10:14:39  t: 05/17/2024 22:10:43  [facility] [id number]  [facility]/    cc: MD Remy Cabral MD

## 2024-11-18 ENCOUNTER — LAB ENCOUNTER (OUTPATIENT)
Dept: LAB | Facility: HOSPITAL | Age: 74
End: 2024-11-18
Attending: RADIOLOGY
Payer: MEDICARE

## 2024-11-18 DIAGNOSIS — C61 PROSTATE CANCER (HCC): ICD-10-CM

## 2024-11-18 LAB — PSA SERPL-MCNC: 0.75 NG/ML (ref ?–4)

## 2024-11-18 PROCEDURE — 36415 COLL VENOUS BLD VENIPUNCTURE: CPT

## 2024-11-18 PROCEDURE — 84153 ASSAY OF PSA TOTAL: CPT

## 2024-11-22 ENCOUNTER — OFFICE VISIT (OUTPATIENT)
Dept: RADIATION ONCOLOGY | Facility: HOSPITAL | Age: 74
End: 2024-11-22
Attending: RADIOLOGY
Payer: MEDICARE

## 2024-11-22 VITALS
OXYGEN SATURATION: 98 % | DIASTOLIC BLOOD PRESSURE: 88 MMHG | TEMPERATURE: 97 F | RESPIRATION RATE: 16 BRPM | HEART RATE: 70 BPM | SYSTOLIC BLOOD PRESSURE: 167 MMHG | WEIGHT: 182 LBS | BODY MASS INDEX: 28 KG/M2

## 2024-11-22 DIAGNOSIS — C61 PROSTATE CANCER (HCC): Primary | ICD-10-CM

## 2024-11-22 PROCEDURE — 99211 OFF/OP EST MAY X REQ PHY/QHP: CPT

## 2024-11-22 NOTE — PATIENT INSTRUCTIONS
Follow up with Dr. Luis Carlos Murillo in 6 months with a PSA.  Julienne will call you to schedule your follow up appointment.   Please call [phone] with any radiation questions.   1 week prior to PSA blood test, please refrain from bike riding, sexual activity and no prostate exams.

## 2024-11-22 NOTE — PROGRESS NOTES
Nursing Follow-Up Note    Patient: Ga Pop  YOB: 1950  Age: 74 year old  Radiation Oncologist: Dr. Hosea Murillo  Referring Physician: No ref. provider found  Chief Complaint:   Chief Complaint   Patient presents with    Follow - Up     RT     Date: 11/22/2024    Toxicities:   N/A    Vital Signs: BP (!) 167/88 (BP Location: Left arm, Patient Position: Sitting, Cuff Size: adult)   Pulse 70   Temp 97.4 °F (36.3 °C) (Temporal)   Resp 16   Wt 82.6 kg (182 lb)   SpO2 98%   BMI 27.67 kg/m² ,   Wt Readings from Last 6 Encounters:   11/22/24 82.6 kg (182 lb)   10/09/24 81.6 kg (180 lb)   05/17/24 79.7 kg (175 lb 12.8 oz)   03/25/24 82.6 kg (182 lb)   03/11/24 82.6 kg (182 lb)   11/17/23 80.6 kg (177 lb 12.8 oz)       Allergies:  Allergies[1]    Nursing Note: Patient seen for follow up with Dr. Luis Carlos Murillo.  used for visit: Barber #[id number]. Patient completed prostate RT 8/24/23. Last seen 5/20/24. Patient feeling well, no BM issues and no issues with urination. AUA= 2, JACOB = 5. PSA drawn 11/18/24, Dr. Luis Carlos Murillo to review with patient. Plan to follow up with Dr. Luis Carlos Murillo in 6 months with a PSA.          [1] No Known Allergies

## 2024-11-23 NOTE — PROGRESS NOTES
[facility]    PATIENT'S NAME: LORIN GUERRA   RADIATION ONCOLOGIST: Hosea Murillo MD   PATIENT ACCOUNT #: [id number] LOCATION: [facility]   MEDICAL RECORD #: [id number] YOB: 1950   FOLLOW-UP DATE: 11/22/2024       RADIATION ONCOLOGY FOLLOW-UP NOTE    REFERRING PHYSICIAN:  Nahomy Hirsch MD    DIAGNOSIS:  Adenocarcinoma of the prostate, PSA 9.76, grade group 2, favorable intermediate risk.    REGION TREATED:  Prostate, 7000 cGy, completed 08/24/2023.    INTERVAL SINCE COMPLETION OF RADIATION THERAPY:  1 year and 3 months.    PATIENT STATUS:  Clinically and biochemically LARRY.    HISTORY:  The patient is a 74-year-old male who presents today for a routine followup visit.  He has a history of favorable intermediate risk disease with a PSA that went up to 9.76.  A biopsy showed grade group 2 disease in 1 of 3 cores from the right apex, and all other the cores were negative.  Even within the involved core, only 10% of the submitted tissue had disease with no perineural invasion.  He had a difficult time after the biopsy as this was complicated by an E coli infection with prostatitis and UTI.  After this resolved, he was referred to Radiation Oncology, and we proceed with 7000 cGy in 28 fractions.  His treatment was completed on 08/24/2023.  As he had favorable intermediate risk disease, he was treated with radiation alone with no ADT.    On his visit today, the patient continues to do extremely well.  He denies any particular issues or complaints.  He has a good appetite and denies any fatigue.  He has no urinary issues and has an AUA score today of 2/35.  He denies any bowel-related problems.  The remainder of his medical health is stable.    His most recent PSA was on 11/18/2024 and is noted to be 0.75.  This establishes a new post treatment chapito for him.    PHYSICAL EXAMINATION:    VITAL SIGNS:  On exam today, the patient is noted to have a blood pressure of 167/88, pulse 70,  respiratory rate 16, and temperature 97.4 degrees.  He has a pain score of 0 and a weight of 182 pounds.  NECK:  Supple with no lymphadenopathy.  LUNGS:  Clear to auscultation bilaterally.  HEART:  Regular rate and rhythm.  Normal S1, S2 with no audible murmurs.  LYMPHATICS:  There is no supraclavicular, axillary, or inguinal lymphadenopathy.  ABDOMEN:  Benign.    IMPRESSION AND RECOMMENDATIONS:  Overall, the patient is doing very well at this time.  He is clinically and biochemically free of disease and has had no meaningful side effects as a result of treatment.  I remain very optimistic regarding his prognosis.  I would like to see him again for followup in 6 months, and we will get a PSA prior to that visit.  I did tell him that if he should have any problems or questions prior to that time, he should feel free to contact my office, I would be happy to see him sooner.    Thank you very much for allowing me the opportunity to participate in the care of this patient.  If there should be any questions regarding the radiotherapy, please feel free to contact me at any time.    Dictated By Hosea Murillo MD  d: 11/22/2024 10:24:42  t: 11/23/2024 01:05:52  Job [id number]  NAD/    cc: MD Remy Cabral MD

## 2025-03-25 ENCOUNTER — HOSPITAL ENCOUNTER (OUTPATIENT)
Dept: ULTRASOUND IMAGING | Facility: HOSPITAL | Age: 75
Discharge: HOME | End: 2025-03-25
Attending: FAMILY MEDICINE
Payer: MEDICARE

## 2025-03-25 DIAGNOSIS — R42 DIZZINESS: ICD-10-CM

## 2025-03-25 DIAGNOSIS — E78.5 HYPERLIPIDEMIA, UNSPECIFIED HYPERLIPIDEMIA TYPE: ICD-10-CM

## 2025-03-25 PROCEDURE — 93880 EXTRACRANIAL BILAT STUDY: CPT | Performed by: FAMILY MEDICINE

## 2025-05-20 ENCOUNTER — LAB ENCOUNTER (OUTPATIENT)
Dept: LAB | Facility: HOSPITAL | Age: 75
End: 2025-05-20
Attending: RADIOLOGY
Payer: MEDICARE

## 2025-05-20 DIAGNOSIS — C61 PROSTATE CANCER (HCC): ICD-10-CM

## 2025-05-20 LAB — PSA SERPL-MCNC: 1.09 NG/ML (ref ?–4)

## 2025-05-20 PROCEDURE — 36415 COLL VENOUS BLD VENIPUNCTURE: CPT

## 2025-05-20 PROCEDURE — 84153 ASSAY OF PSA TOTAL: CPT

## 2025-05-22 NOTE — PROGRESS NOTES
Nursing Follow-Up Note    Patient: Ga Pop  YOB: 1950  Age: 74 year old  Radiation Oncologist: Dr. Hosea Murillo  Referring Physician: No ref. provider found  Chief Complaint:   Chief Complaint   Patient presents with    Follow - Up     RT prostate     Date: 5/22/2025    Toxicities: NA    Vital Signs: BP (!) 166/79 (BP Location: Left arm, Patient Position: Sitting, Cuff Size: adult)   Pulse 59   Temp 97 °F (36.1 °C) (Temporal)   Resp 18   Wt 83.1 kg (183 lb 3.2 oz)   SpO2 98%   BMI 27.86 kg/m² ,   Wt Readings from Last 6 Encounters:   05/23/25 83.1 kg (183 lb 3.2 oz)   03/10/25 82.6 kg (182 lb)   11/22/24 82.6 kg (182 lb)   10/09/24 81.6 kg (180 lb)   05/17/24 79.7 kg (175 lb 12.8 oz)   03/25/24 82.6 kg (182 lb)       Allergies:  Allergies[1]    Nursing Note: Completed RT to prostate on 8/24/23. Here for 6 month f/u. PSA 1.09 on 5/20/25. AUA 1, JACOB 3.  used #[id number], Rody. Accompanied by wife. Doing well. Denies dysuria or hematuria. Nocturia x1. Denies bowel issues. Appetite good.          [1] No Known Allergies

## 2025-05-23 ENCOUNTER — OFFICE VISIT (OUTPATIENT)
Dept: RADIATION ONCOLOGY | Facility: HOSPITAL | Age: 75
End: 2025-05-23
Attending: RADIOLOGY
Payer: MEDICARE

## 2025-05-23 VITALS
WEIGHT: 183.19 LBS | OXYGEN SATURATION: 98 % | DIASTOLIC BLOOD PRESSURE: 79 MMHG | SYSTOLIC BLOOD PRESSURE: 166 MMHG | TEMPERATURE: 97 F | RESPIRATION RATE: 18 BRPM | HEART RATE: 59 BPM | BODY MASS INDEX: 28 KG/M2

## 2025-05-23 DIAGNOSIS — C61 PROSTATE CANCER (HCC): Primary | ICD-10-CM

## 2025-05-23 PROCEDURE — 99211 OFF/OP EST MAY X REQ PHY/QHP: CPT

## 2025-05-23 NOTE — PROGRESS NOTES
[facility]    PATIENT'S NAME: LORIN GUERRA   RADIATION ONCOLOGIST: Hosea Murillo MD   PATIENT ACCOUNT #: [id number] LOCATION: [facility]   MEDICAL RECORD #: [id number] YOB: 1950   FOLLOW-UP DATE: 05/23/2025       RADIATION ONCOLOGY FOLLOW-UP NOTE    REFERRING PHYSICIAN:  Nahomy Hirsch MD.    DIAGNOSIS:  Adenocarcinoma of the prostate, PSA 9.76, grade group 2, favorable intermediate risk.    REGION TREATED:  Prostate, 7000 cGy, completed 08/24/2023.    INTERVAL SINCE COMPLETION OF RADIATION THERAPY:  One year and 9 months.    PATIENT STATUS:  Clinically and biochemically LARRY.      HISTORY:  The patient is a 74-year-old male who presents today for a routine followup visit.  He has a history of favorable intermediate risk disease with a PSA that went up to 9.76.  A biopsy had showed grade group 2 disease in 1 of 3 cores from the right apex, and all other cores were negative.  Even within the involved core, only 10% of the submitted tissue had disease.  There was no perineural invasion.  He had a difficult time after the biopsy as it was complicated by an E coli infection with prostatitis and a UTI.  After this resolved, he was referred to Radiation Oncology, and I proceeded with 7000 cGy in 28 fractions, completing on 08/24/2023.  As he had favorable intermediate risk disease, he was treated with radiation alone with no ADT.    On his visit today, the patient continues to do extremely well.  He has no issues or complaints.  His appetite is good, and his energy level is normal.  He has no bowel-related issues or problems.  He has an AUA score today of 1/35 and a Akira score of 3/25.  He ultimately reports no changes from his visit 6 months ago.    His most recent PSA was on 05/20/2025, is noted to be 1.09.  This is slightly elevated from his chapito level of 0.75 from 6 months ago.    PHYSICAL EXAMINATION:    VITAL SIGNS:  On exam today, the patient is noted to have a blood  pressure of 166/79, pulse of 59, respiratory rate of 18, and a temperature of 97.0.  He has a pain score of 0 and a weight of 183 pounds.  NECK:  Supple with no lymphadenopathy.  LUNGS:  Clear to auscultation bilaterally.  HEART:  Regular rate and rhythm with a normal S1, S2, and no audible murmurs.  LYMPHATICS:  No supraclavicular, axillary, or inguinal lymphadenopathy.  ABDOMEN:  Benign.    IMPRESSION AND RECOMMENDATIONS:  Overall, the patient continues to do extremely well at this time.  He is clinically and biochemically free of disease and has had no meaningful side effects as a result of his radiation.  His PSA did go up very slightly from his last visit.  That prior visit had achieved his post-treatment chapito, so it would be expected to have some slight elevation.  So long as his PSA continues to hover around this level, I am pleased with his progress and feel no further workup is necessary.    I therefore will see him again for followup in 6 months with a repeat PSA at that time.  I did tell him that if he should have any problems or questions prior to that time, he should feel free to contact my office and I would be happy to see him sooner.    Thank you very much for allowing me the opportunity to participate in the care of this patient.  If there should be any questions regarding the radiotherapy, please feel free to contact me at any time.    Dictated By Hosea Murillo MD  d: 05/23/2025 09:12:53  t: 05/23/2025 09:48:15  [facility] [id number]  NAD/    cc: MD Nahomy Hopkins MD

## 2025-05-23 NOTE — PATIENT INSTRUCTIONS
Follow up with Dr. Luis Carlos Murillo in 6 months with a PSA blood test done prior.  Julienne will call you to schedule your follow up appointment.     Please call [phone] with any radiation questions.     1 week prior to PSA blood test, please refrain from bike riding, sexual activity and no prostate exams.

## 2025-07-17 ENCOUNTER — OFFICE VISIT (OUTPATIENT)
Facility: CLINIC | Age: 75
End: 2025-07-17

## 2025-07-17 VITALS — SYSTOLIC BLOOD PRESSURE: 130 MMHG | DIASTOLIC BLOOD PRESSURE: 70 MMHG | BODY MASS INDEX: 28 KG/M2 | WEIGHT: 182 LBS

## 2025-07-17 DIAGNOSIS — I65.23 BILATERAL CAROTID ARTERY STENOSIS: Primary | ICD-10-CM

## 2025-07-17 PROCEDURE — 99204 OFFICE O/P NEW MOD 45 MIN: CPT | Performed by: SURGERY

## 2025-07-17 NOTE — PROGRESS NOTES
Samer F. Najjar, MD  Vascular Surgery  [facility]      VASCULAR SURGERY   CLINIC CONSULT NOTE        Name: Ga Pop   :   1950  [id number]     REFERRING PHYSICIAN:  Ruiz Pulido  PRIMARY CARE PHYSICIAN:  RIUZ PULIDO MD    HISTORY OF PRESENT ILLNESS:   Patient is a 74 year old male who has been referred regarding management of his carotid artery disease seen on a recent carotid duplex. This showed the right carotid to have moderate stenosis  while the left has moderate stenosis . The patient is asymptomatic, specifically denying any focal neurologic symptoms. He denies any smoking and is on a statin but was not on ASA.     PAST MEDICAL HISTORY:    Past Medical History[1]    PAST SURGICAL HISTORY:   Past Surgical History[2]     MEDICATIONS:   Medications - Current[3]    ALLERGIES:    He has no known allergies.    SOCIAL HISTORY:    Patient  reports that he has never smoked. He has never been exposed to tobacco smoke. He has never used smokeless tobacco. He reports that he does not drink alcohol and does not use drugs.    FAMILY HISTORY:    Patient's family history is not on file.    ROS:     A 12 point review of systems with pertinent positives and negatives listed in the HPI.    EXAM:    /70   Wt 182 lb (82.6 kg)   BMI 27.67 kg/m²   GENERAL: alert and orientated X 3, well developed, well nourished, in no apparent distress  PSYCH: normal mood and affect  HEENT: ears and throat are clear  NECK: supple, no lymphadenopathy, thyroid wnl  CAROTID: No bruits  RESPIRATORY: no rales, rhonchi, or wheezes B  CARDIO: RRR without murmur, no murmur, no gallop   ABDOMEN: soft, non-tender with no palpable aneurysm or masses  BACK: normal, no tenderness  SKIN: no rashes, warm and dry  EXTREMITIES: no tenderness  NEURO: no sensory or motor deficits  VASCULAR:      Femoral Popliteal DP PT Peroneal   Right 2+       palpable, strong palpable, weak    Left 2+       palpable, strong palpable,  weak          IMAGING:       ASSESSMENT  Diagnoses and all orders for this visit:    Bilateral carotid artery stenosis       I have reviewed the patient's prior documentation and studies from Epic and from [place].  I reassured the patient that no intervention is necessary for his bilateral carotid artery disease given that he is asymptomatic and the stenosis is not severe. The patient was educated on the signs and symptoms of TIA and stroke with importance to proceed to the emergency room in that event. I have recommended that he start on an ASA therapy. I counseled the his on general cerebrovascular risk factor control for stroke and MI prevention including  hypertension control, antiplatelet and statin therapy, diabetes monitoring as well as lifestyle modifications including a healthy low fat/low calorie diet, regular moderate exercise, and continuing to abstain from smoking and decrease alcohol use.The patient will require serial carotid duplex ultrasound studies to monitor his carotid stenosis.     PLAN:  Serial carotid duplex ultrasound every March which we will order for consistency.     The patient indicated an understanding of these issues and agreed to the plan and all questions were answered during the clinic visit.      Thank you for allowing me to participate in your patient's care.   Please do not hesitate to contact me with any questions.    Sincerely,  Samer F. Najjar MD    Please note: Dragon speech recognition software was used to prepare this note. If a word or phrase is confusing, it is likely do to a failure of recognition.   Please contact me with any questions or clarifications.       [1]   Past Medical History:   Arthritis    Diabetes (HCC)    Essential hypertension    Hyperlipidemia    Prostate cancer (HCC)   [2] No past surgical history on file.  [3]   Current Outpatient Medications:     ENALAPRIL 20 MG Oral Tab, TAKE 1 TABLET(20 MG) BY MOUTH DAILY, Disp: 90 tablet, Rfl: 1    METFORMIN 500  MG Oral Tab, TAKE 1 TABLET(500 MG) BY MOUTH TWICE DAILY, Disp: 180 tablet, Rfl: 1    ATORVASTATIN 40 MG Oral Tab, TAKE 1 TABLET(40 MG) BY MOUTH DAILY, Disp: 90 tablet, Rfl: 1    MECLIZINE 25 MG Oral Tab, TAKE 1 TABLET(25 MG) BY MOUTH EVERY 8 HOURS AS NEEDED, Disp: 30 tablet, Rfl: 3    pioglitazone 30 MG Oral Tab, Take 1 tablet (30 mg total) by mouth daily., Disp: 90 tablet, Rfl: 0    Glucose Blood (ACCU-CHEK JAHAIRA PLUS) In Vitro Strip, CHECK BLOOD SUGAR DAILY IN THE MORNING, Disp: 100 strip, Rfl: 3    IBUPROFEN 800 MG Oral Tab, TAKE 1 TABLET(800 MG) BY MOUTH THREE TIMES DAILY (Patient not taking: Reported on 7/17/2025), Disp: 270 tablet, Rfl: 0    ibuprofen 800 MG Oral Tab, Take 1 tablet (800 mg total) by mouth every 8 (eight) hours as needed for Pain. (Patient not taking: Reported on 7/17/2025), Disp: 90 tablet, Rfl: 1

## (undated) NOTE — ED AVS SNAPSHOT
[facility]    [place] [address]     [address] [place] [address]    Phone:  [phone]    Fax:  [phone]           Adrian Gonzalez   MRN: [id number]    Department:  [facility]   Date of Visit:  3/2 coverage and benefits available for follow-up care and referrals. If you have difficulty scheduling your follow-up appointment as directed, please call our  at [phone].      Si tiene problemas para programar ramakrishna zulma de seguimiento s different from what your Primary Care doctor has instructed you - please continue to take your medications as instructed by your Primary Care doctor until you can check with your doctor. Please bring the medication list to your next doctor's appointment. can help with your Affordable Care Act coverage, as well as all types of Medicaid plans. To get signed up and covered, please call [phone] and ask to get set up for an insurance coverage that is in-network with [facility]

## (undated) NOTE — ED AVS SNAPSHOT
[facility]    [place] [address]     [address] [place] [address]    Phone:  [phone]    Fax:  [phone]           Lelo Rehman   MRN: [id number]    Department:  [facility]   Date of Visit:  3/2 and Class Registration line at [phone] or find a doctor online by visiting [email or web address].    IF THERE IS ANY CHANGE OR WORSENING OF YOUR CONDITION, CALL YOUR PRIMARY CARE PHYSICIAN AT ONCE OR RETURN IMMEDIATELY TO [address] [place] [address].     If